# Patient Record
Sex: MALE | Race: WHITE | NOT HISPANIC OR LATINO | Employment: FULL TIME | URBAN - NONMETROPOLITAN AREA
[De-identification: names, ages, dates, MRNs, and addresses within clinical notes are randomized per-mention and may not be internally consistent; named-entity substitution may affect disease eponyms.]

---

## 2023-08-30 ENCOUNTER — TELEPHONE (OUTPATIENT)
Dept: PRIMARY CARE | Facility: CLINIC | Age: 48
End: 2023-08-30
Payer: COMMERCIAL

## 2023-08-30 DIAGNOSIS — Z12.5 ENCOUNTER FOR SCREENING FOR MALIGNANT NEOPLASM OF PROSTATE: ICD-10-CM

## 2023-08-30 DIAGNOSIS — R73.03 PREDIABETES: Primary | ICD-10-CM

## 2023-08-30 NOTE — TELEPHONE ENCOUNTER
Patient in a 1 year recall with thyroid ultrasound prior.  Do you want patient seen first and discuss thyroid ultrasound? Or do you want to order to have results prior to appt?

## 2023-09-26 ENCOUNTER — TELEPHONE (OUTPATIENT)
Dept: PRIMARY CARE | Facility: CLINIC | Age: 48
End: 2023-09-26
Payer: COMMERCIAL

## 2023-09-26 DIAGNOSIS — R73.03 PREDIABETES: Primary | ICD-10-CM

## 2023-12-07 ENCOUNTER — LAB (OUTPATIENT)
Dept: LAB | Facility: LAB | Age: 48
End: 2023-12-07
Payer: COMMERCIAL

## 2023-12-07 DIAGNOSIS — R73.03 PREDIABETES: ICD-10-CM

## 2023-12-07 DIAGNOSIS — Z12.5 ENCOUNTER FOR SCREENING FOR MALIGNANT NEOPLASM OF PROSTATE: ICD-10-CM

## 2023-12-07 LAB
ALBUMIN SERPL BCP-MCNC: 4.3 G/DL (ref 3.4–5)
ALP SERPL-CCNC: 69 U/L (ref 33–120)
ALT SERPL W P-5'-P-CCNC: 28 U/L (ref 10–52)
ANION GAP SERPL CALC-SCNC: 10 MMOL/L (ref 10–20)
AST SERPL W P-5'-P-CCNC: 17 U/L (ref 9–39)
BILIRUB SERPL-MCNC: 0.6 MG/DL (ref 0–1.2)
BUN SERPL-MCNC: 15 MG/DL (ref 6–23)
CALCIUM SERPL-MCNC: 9.2 MG/DL (ref 8.6–10.3)
CHLORIDE SERPL-SCNC: 105 MMOL/L (ref 98–107)
CHOLEST SERPL-MCNC: 168 MG/DL (ref 0–199)
CHOLESTEROL/HDL RATIO: 3.8
CO2 SERPL-SCNC: 29 MMOL/L (ref 21–32)
CREAT SERPL-MCNC: 1.01 MG/DL (ref 0.5–1.3)
ERYTHROCYTE [DISTWIDTH] IN BLOOD BY AUTOMATED COUNT: 12.7 % (ref 11.5–14.5)
EST. AVERAGE GLUCOSE BLD GHB EST-MCNC: 123 MG/DL
GFR SERPL CREATININE-BSD FRML MDRD: >90 ML/MIN/1.73M*2
GLUCOSE SERPL-MCNC: 99 MG/DL (ref 74–99)
HBA1C MFR BLD: 5.9 %
HCT VFR BLD AUTO: 45.2 % (ref 41–52)
HDLC SERPL-MCNC: 44 MG/DL
HGB BLD-MCNC: 14.8 G/DL (ref 13.5–17.5)
LDLC SERPL CALC-MCNC: 109 MG/DL
MCH RBC QN AUTO: 30.3 PG (ref 26–34)
MCHC RBC AUTO-ENTMCNC: 32.7 G/DL (ref 32–36)
MCV RBC AUTO: 93 FL (ref 80–100)
NON HDL CHOLESTEROL: 124 MG/DL (ref 0–149)
NRBC BLD-RTO: 0 /100 WBCS (ref 0–0)
PLATELET # BLD AUTO: 232 X10*3/UL (ref 150–450)
POTASSIUM SERPL-SCNC: 4.1 MMOL/L (ref 3.5–5.3)
PROT SERPL-MCNC: 6.3 G/DL (ref 6.4–8.2)
PSA SERPL-MCNC: 0.46 NG/ML
RBC # BLD AUTO: 4.88 X10*6/UL (ref 4.5–5.9)
SODIUM SERPL-SCNC: 140 MMOL/L (ref 136–145)
TRIGL SERPL-MCNC: 75 MG/DL (ref 0–149)
TSH SERPL-ACNC: 2.37 MIU/L (ref 0.44–3.98)
VLDL: 15 MG/DL (ref 0–40)
WBC # BLD AUTO: 6.6 X10*3/UL (ref 4.4–11.3)

## 2023-12-07 PROCEDURE — 80061 LIPID PANEL: CPT

## 2023-12-07 PROCEDURE — 85027 COMPLETE CBC AUTOMATED: CPT

## 2023-12-07 PROCEDURE — 80053 COMPREHEN METABOLIC PANEL: CPT

## 2023-12-07 PROCEDURE — 84153 ASSAY OF PSA TOTAL: CPT

## 2023-12-07 PROCEDURE — 84443 ASSAY THYROID STIM HORMONE: CPT

## 2023-12-07 PROCEDURE — 83036 HEMOGLOBIN GLYCOSYLATED A1C: CPT

## 2023-12-07 PROCEDURE — 36415 COLL VENOUS BLD VENIPUNCTURE: CPT

## 2023-12-12 ENCOUNTER — OFFICE VISIT (OUTPATIENT)
Dept: PRIMARY CARE | Facility: CLINIC | Age: 48
End: 2023-12-12
Payer: COMMERCIAL

## 2023-12-12 VITALS
DIASTOLIC BLOOD PRESSURE: 82 MMHG | HEART RATE: 80 BPM | WEIGHT: 221.8 LBS | SYSTOLIC BLOOD PRESSURE: 142 MMHG | HEIGHT: 70 IN | BODY MASS INDEX: 31.75 KG/M2

## 2023-12-12 DIAGNOSIS — Q25.47 RIGHT AORTIC ARCH (HHS-HCC): ICD-10-CM

## 2023-12-12 DIAGNOSIS — D17.1 LIPOMA OF TORSO: Primary | ICD-10-CM

## 2023-12-12 PROCEDURE — 1036F TOBACCO NON-USER: CPT | Performed by: PHYSICIAN ASSISTANT

## 2023-12-12 PROCEDURE — 99213 OFFICE O/P EST LOW 20 MIN: CPT | Performed by: PHYSICIAN ASSISTANT

## 2023-12-12 ASSESSMENT — PATIENT HEALTH QUESTIONNAIRE - PHQ9
1. LITTLE INTEREST OR PLEASURE IN DOING THINGS: NOT AT ALL
SUM OF ALL RESPONSES TO PHQ9 QUESTIONS 1 AND 2: 0
2. FEELING DOWN, DEPRESSED OR HOPELESS: NOT AT ALL

## 2023-12-12 NOTE — PROGRESS NOTES
"Subjective   Patient ID: Rajeev Davis is a 48 y.o. male who presents for Annual Exam (Yearly exam,  patient states continues with right side discomfort underneath rib cage area, no known injury x 1 year intermittently./Colonoscopy done 3-15-23.).  HPI  Patient presents for annual follow-up.    Patient had recent labs drawn that showed an improvement of A1c from 6.2-5.9.  Patient did this conservatively.    Patient requesting evaluation of a subcutaneous lump on the right flank.  Patient states has been there for several years without issue.  It could be however the cause of some intermittent discomfort when leaning that way.  Patient does have a history of lipoma excision of the scalp.    Review of Systems    Constitutional:  See HPI   Integumentary: See HPI  Neurologic:  Alert and oriented X4, No numbness, No tingling.    All other systems are negative     Objective     /82   Pulse 80   Ht 1.778 m (5' 10\")   Wt 101 kg (221 lb 12.8 oz)   BMI 31.82 kg/m²     Physical Exam  General:  Alert and oriented, No acute distress.    Eye:  Pupils are equal, round and reactive to light, Extraocular movements are intact, Normal conjunctiva.    HENT:  Normocephalic, Normal hearing, Oral mucosa is moist, No pharyngeal erythema, No sinus tenderness.    Neck:  Supple, Non-tender, No lymphadenopathy.    Respiratory:  Lungs are clear to auscultation, Respirations are non-labored, Breath sounds are equal    Cardiovascular:  Normal rate, Regular rhythm.    Gastrointestinal:  Non-distended.    Musculoskeletal: Normal range of motion, Normal strength, No tenderness, No swelling, No deformity, Normal gait.     Integumentary:  Warm, Dry, Intact, No pallor, No rash.    Neurologic:  Alert, Oriented, Normal sensory, Normal motor function, No focal deficits, Cranial Nerves II-XII are grossly intact   Psychiatric:  Cooperative, Appropriate mood & affect.    Assessment/Plan   Subcutaneous mass right flank: Most consistent with " lipoma.  Patient declined further workup at this time.  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  Patient has no complaints in this regard.    Follow-up in 1 year or as needed  Problem List Items Addressed This Visit       Right aortic arch     Other Visit Diagnoses       Lipoma of torso    -  Primary            Final diagnoses:   [D17.1] Lipoma of torso   [Q25.47] Right aortic arch

## 2024-05-25 ENCOUNTER — OFFICE VISIT (OUTPATIENT)
Dept: URGENT CARE | Facility: CLINIC | Age: 49
End: 2024-05-25
Payer: COMMERCIAL

## 2024-05-25 VITALS
WEIGHT: 215 LBS | SYSTOLIC BLOOD PRESSURE: 129 MMHG | OXYGEN SATURATION: 97 % | TEMPERATURE: 98 F | RESPIRATION RATE: 16 BRPM | DIASTOLIC BLOOD PRESSURE: 88 MMHG | HEART RATE: 70 BPM | BODY MASS INDEX: 30.78 KG/M2 | HEIGHT: 70 IN

## 2024-05-25 DIAGNOSIS — H69.91 DYSFUNCTION OF RIGHT EUSTACHIAN TUBE: Primary | ICD-10-CM

## 2024-05-25 PROCEDURE — 99213 OFFICE O/P EST LOW 20 MIN: CPT | Performed by: NURSE PRACTITIONER

## 2024-05-25 NOTE — PROGRESS NOTES
"[facility]  Elis Godoy, APRN-CNP     Visit Note - 5/25/2024 11:58 AM   This note was generated with voice recognition software and may contain errors including spelling, grammar, syntax, and misrecognization of what was dictated.    Patient: Rajeev Davis, MRN: [id number], 48 y.o., male   PCP: Jessica Cook MD  ------------------------------------  ALLERGIES: No Known Allergies     CURRENT MEDICATIONS: No current outpatient medications  ------------------------------------  PAST MEDICAL HX:  Patient Active Problem List   Diagnosis    Right aortic arch ([facility])      SURGICAL HX:  Past Surgical History:   Procedure Laterality Date    OTHER SURGICAL HISTORY  09/17/2022    No history of surgery      FAMILY HX:   No pertinent history.   SOCIAL HX:    reports that he has never smoked. He has quit using smokeless tobacco. .   ------------------------------------  CHIEF COMPLAINT:   Chief Complaint   Patient presents with    Ear Fullness     Right ear pressure/ringing x 1 day       HISTORY OF PRESENT ILLNESS: The history was obtained from patient. Rajeev is a 48 y.o. male, who presents with a chief complaint of R ear fullness/pressure and \"sounds like wind moving across a microphone\" - sxs started 2-3 days ago. Reports his hearing seems okay, but he has felt like his equilibrium seems a little off, at times.He denies any ear pain. No known trauma to ear or recent swimming. No sore throat, current nasal congestion, cough, change in appetite, fevers, body aches, fatigue, headaches, nausea/vomiting, or constipation/diarrhea. He denies any lightheadedness.  No recent illness, but wife states has has long history of \"sinus issues.\" Has tried ibuprofen and benadryl, with improvement in his symptoms (\"not dizzy at all today\"); has not tried any other OTC medications or conservative measures for symptoms.     REVIEW OF SYSTEMS:  10 systems reviewed negative with exception of history of present " "illness as listed above.    TODAY'S VITALS: /88 (BP Location: Right arm, Patient Position: Sitting, BP Cuff Size: Large adult)   Pulse 70   Temp 36.7 °C (98 °F) (Temporal)   Resp 16   Ht 1.778 m (5' 10\")   Wt 97.5 kg (215 lb)   SpO2 97%   BMI 30.85 kg/m²     PHYSICAL EXAMINATION:  General: Pleasant male, alert and oriented, in no acute distress.  Sitting comfortably on exam table. Accompanied by his wife.  Eyes:  Pupils are equal, round and reactive. Bilat conjunctiva clear; no exudate to eyes noted.   HENT: Normocephalic.  Bilateral TMs with clear fluid bubbles, but not retracted or bulging, and no erythema.  Bilateral ear canals unremarkable.  No otorrhea.  Nasal mucosa mildly injected and with trace edema; no sinus tenderness; has very mild audible nasal congestion. Mucous membranes moist. No oral lesions; posterior pharynx unremarkable. Able to swallow/manage oral secretions without difficulty.  Neck:  Supple; no palpable lymphadenopathy.  Respiratory:  Respirations are easy and non-labored, with normal rate. Symmetrical chest wall expansion. Lungs are clear to auscultation - no wheezing, rhonchi, or rales. Breath sounds equal.  No cough noted during visit.  Cardiovascular:  Normal rate, Regular rhythm.  Normal S1-S2.  No m/r/g.   Musculoskeletal:  Grossly normal for age.     Integumentary:  Pink, warm, dry, and Intact. Normal skin turgor; no rashes appreciated.   Neurologic:  Alert, Oriented, Sensory and motor function grossly intact.  No instability with position changes; gait unremarkable.  Cognition and Speech: Oriented; Speech clear and coherent  Psychiatric:  Cooperative, Appropriate mood & affect.     ------------------------------------  Medical Decision Making  LABORATORY or RADIOLOGICAL IMAGING ORDERS/RESULTS:   None    IMPRESSION/PLAN:  Course: Worsening; stable    1. Dysfunction of right eustachian tube  Symptoms/exam consistent with eustachian tube dysfunction. Will begin conservative " measures at this point; no indication for antibiotic use right now.  Discussed strategies for management of symptoms-saline nasal spray, gentle auto insufflation, chewing gum, yawning, etc. OTC antihistamines/decongestants may also be helpful. Reviewed expectations for resolution of infection and improvement in symptoms (can take 4 to 6 weeks for symptoms to resolve completely), and encouraged to follow-up with PCP if symptoms not improving over the 2 weeks, or to seek care sooner if they worsen. Patient seems satisfied and agrees with plan of care; questions were encouraged and answered.         MING Ro-CNP   Advanced Practice Provider  [facility]

## 2024-06-06 ENCOUNTER — APPOINTMENT (OUTPATIENT)
Dept: PRIMARY CARE | Facility: CLINIC | Age: 49
End: 2024-06-06
Payer: COMMERCIAL

## 2024-06-17 ENCOUNTER — APPOINTMENT (OUTPATIENT)
Dept: PRIMARY CARE | Facility: CLINIC | Age: 49
End: 2024-06-17
Payer: COMMERCIAL

## 2024-06-17 VITALS
HEART RATE: 89 BPM | WEIGHT: 215.32 LBS | BODY MASS INDEX: 30.83 KG/M2 | HEIGHT: 70 IN | DIASTOLIC BLOOD PRESSURE: 90 MMHG | OXYGEN SATURATION: 95 % | SYSTOLIC BLOOD PRESSURE: 128 MMHG

## 2024-06-17 DIAGNOSIS — Z76.89 ENCOUNTER TO ESTABLISH CARE: Primary | ICD-10-CM

## 2024-06-17 DIAGNOSIS — E78.2 MODERATE MIXED HYPERLIPIDEMIA NOT REQUIRING STATIN THERAPY: ICD-10-CM

## 2024-06-17 DIAGNOSIS — Z12.5 SCREENING FOR PROSTATE CANCER: ICD-10-CM

## 2024-06-17 DIAGNOSIS — R73.03 PREDIABETES: ICD-10-CM

## 2024-06-17 PROCEDURE — 99214 OFFICE O/P EST MOD 30 MIN: CPT | Performed by: STUDENT IN AN ORGANIZED HEALTH CARE EDUCATION/TRAINING PROGRAM

## 2024-06-17 PROCEDURE — 1036F TOBACCO NON-USER: CPT | Performed by: STUDENT IN AN ORGANIZED HEALTH CARE EDUCATION/TRAINING PROGRAM

## 2024-06-17 RX ORDER — CETIRIZINE HYDROCHLORIDE 10 MG/1
10 TABLET, CHEWABLE ORAL AS NEEDED
COMMUNITY

## 2024-06-17 RX ORDER — IBUPROFEN 200 MG
600 TABLET ORAL AS NEEDED
COMMUNITY

## 2024-06-17 ASSESSMENT — PATIENT HEALTH QUESTIONNAIRE - PHQ9
SUM OF ALL RESPONSES TO PHQ9 QUESTIONS 1 & 2: 0
2. FEELING DOWN, DEPRESSED OR HOPELESS: NOT AT ALL
1. LITTLE INTEREST OR PLEASURE IN DOING THINGS: NOT AT ALL

## 2024-06-17 NOTE — PATIENT INSTRUCTIONS
I would recommend getting your shingles vaccine at the pharmacy. You get one now and one in 2-6 months. About 30% of people will feel unwell after the shot so I recommend getting it done on a day that you do not have important plans the next day.

## 2024-06-17 NOTE — PROGRESS NOTES
Subjective:  Rajeev Davis is a 48 y.o. male who presents to clinic today for Establish Care (RINGING IN RT EAR, Dizziness)      He notes that he is here because he needs to establish care.    He notes that he is having some ringing in his right ear. He went to urgent care 3 weeks ago and was told that his ear had some fluid and was not draining well. So he started zyrtec and ibuprofen. He initially had some dizziness after being bit by a deerfly. That has resolved. When he has background noise he does not notice the dizziness. It is not pulsatile, it remains steady.     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 He has been evaluated fully at the [facility] who will follow him occasionally.     Review of Systems    Assessment/Plan:  Rajeev Davis is a 48 y.o. male with a history of allergies who presents to clinic today to address the following issues:   1. Encounter to establish care        2. Prediabetes  Hemoglobin A1c    Basic metabolic panel      3. Moderate mixed hyperlipidemia not requiring statin therapy  Lipid panel      4. Screening for prostate cancer  PSA        Reviewed health history and recommended that Rajeev switch from zyrtec to flonase for the ringing in his ears. I encouraged him to contact our office if not improving. No worrisome features of his ringing.    Problem List Items Addressed This Visit       Prediabetes    Relevant Orders    Hemoglobin A1c    Basic metabolic panel    Moderate mixed hyperlipidemia not requiring statin therapy    Relevant Orders    Lipid panel     Other Visit Diagnoses       Encounter to establish care    -  Primary    Screening for prostate cancer        Relevant Orders    PSA            Patient Instructions   I would recommend getting your shingles vaccine at the pharmacy. You get one now and one in 2-6 months. About 30% of people will feel unwell after the shot so I recommend getting it done on a day that you do  "not have important plans the next day.     Follow up: 1 year or sooner as needed    Return precautions discussed.  An After Visit Summary was given to the patient.  All questions were answered and patient in agreement with plan.    Objective:  /90   Pulse 89   Ht 1.778 m (5' 10\")   Wt 97.7 kg (215 lb 5.1 oz)   SpO2 95%   BMI 30.90 kg/m²     Physical Exam  Vitals and nursing note reviewed.   Constitutional:       General: He is not in acute distress.     Appearance: Normal appearance. He is not ill-appearing.   HENT:      Head: Normocephalic and atraumatic.      Right Ear: Tympanic membrane, ear canal and external ear normal. There is no impacted cerumen.      Left Ear: Tympanic membrane, ear canal and external ear normal. There is no impacted cerumen.      Nose: No congestion.      Right Turbinates: Enlarged.      Left Turbinates: Enlarged.      Mouth/Throat:      Mouth: Mucous membranes are moist.      Pharynx: Posterior oropharyngeal erythema present.   Eyes:      General: No scleral icterus.        Right eye: No discharge.         Left eye: No discharge.      Extraocular Movements: Extraocular movements intact.      Conjunctiva/sclera: Conjunctivae normal.   Cardiovascular:      Rate and Rhythm: Normal rate and regular rhythm.      Heart sounds: No murmur heard.  Pulmonary:      Effort: Pulmonary effort is normal. No respiratory distress.      Breath sounds: Normal breath sounds. No wheezing.   Neurological:      General: No focal deficit present.      Mental Status: He is alert and oriented to person, place, and time.         I spent 28 minutes in total time for this visit including all related clinical activities before, during, and after the visit excluding other billable activities/procedure time.     Jessica Cook MD    "

## 2025-04-16 ENCOUNTER — TELEPHONE (OUTPATIENT)
Age: 50
End: 2025-04-16
Payer: COMMERCIAL

## 2025-06-19 ENCOUNTER — APPOINTMENT (OUTPATIENT)
Dept: PRIMARY CARE | Facility: CLINIC | Age: 50
End: 2025-06-19
Payer: COMMERCIAL